# Patient Record
Sex: MALE | Race: BLACK OR AFRICAN AMERICAN | NOT HISPANIC OR LATINO | URBAN - METROPOLITAN AREA
[De-identification: names, ages, dates, MRNs, and addresses within clinical notes are randomized per-mention and may not be internally consistent; named-entity substitution may affect disease eponyms.]

---

## 2021-01-01 ENCOUNTER — NURSE TRIAGE (OUTPATIENT)
Dept: NURSING | Facility: CLINIC | Age: 26
End: 2021-01-01

## 2021-01-01 ENCOUNTER — OFFICE VISIT (OUTPATIENT)
Dept: FAMILY MEDICINE | Facility: CLINIC | Age: 26
End: 2021-01-01

## 2021-01-01 VITALS
SYSTOLIC BLOOD PRESSURE: 122 MMHG | TEMPERATURE: 98.2 F | OXYGEN SATURATION: 99 % | HEART RATE: 66 BPM | DIASTOLIC BLOOD PRESSURE: 85 MMHG | RESPIRATION RATE: 16 BRPM | WEIGHT: 129 LBS

## 2021-01-01 DIAGNOSIS — Z11.3 SCREEN FOR STD (SEXUALLY TRANSMITTED DISEASE): ICD-10-CM

## 2021-01-01 DIAGNOSIS — N34.2 URETHRITIS: Primary | ICD-10-CM

## 2021-01-01 LAB — HIV 1+2 AB+HIV1 P24 AG SERPL QL IA: NEGATIVE

## 2021-01-01 PROCEDURE — 87491 CHLMYD TRACH DNA AMP PROBE: CPT | Performed by: EMERGENCY MEDICINE

## 2021-01-01 PROCEDURE — 86780 TREPONEMA PALLIDUM: CPT | Performed by: EMERGENCY MEDICINE

## 2021-01-01 PROCEDURE — 87591 N.GONORRHOEAE DNA AMP PROB: CPT | Performed by: EMERGENCY MEDICINE

## 2021-01-01 PROCEDURE — 99203 OFFICE O/P NEW LOW 30 MIN: CPT | Mod: 25 | Performed by: EMERGENCY MEDICINE

## 2021-01-01 PROCEDURE — 87389 HIV-1 AG W/HIV-1&-2 AB AG IA: CPT | Performed by: EMERGENCY MEDICINE

## 2021-01-01 PROCEDURE — 96372 THER/PROPH/DIAG INJ SC/IM: CPT | Performed by: EMERGENCY MEDICINE

## 2021-01-01 PROCEDURE — 36415 COLL VENOUS BLD VENIPUNCTURE: CPT | Performed by: EMERGENCY MEDICINE

## 2021-01-01 RX ORDER — DOXYCYCLINE 100 MG/1
100 TABLET ORAL DAILY
Qty: 7 TABLET | Refills: 0 | Status: SHIPPED | OUTPATIENT
Start: 2021-01-01 | End: 2022-01-01

## 2021-12-31 NOTE — PROGRESS NOTES
Impression:  Urethritis, probably sexually transmitted disease    Plan:  Check urine for GC and chlamydia, HIV and RPR, ceftriaxone 500 mg IM, doxycycline 100 mg twice a day for 7 days, avoid intercourse or use condoms until antibiotics complete, advise his sexual partner to also be tested and treated      Chief Complaint:  Patient presents with:  STD: burning with urination         HPI:   Uli Ramirez is a 26 year old male who presents to this clinic for the evaluation of dysuria and penile discharge.  Patient had sexual intercourse with a new female sexual partner 3 days ago.  Yesterday he developed dysuria and penile discharge and is concerned about a sexually transmitted infection.  He has the patient and she was not aware of any STDs.  No rash.  No fever.  No other complaints      PMH:   No past medical history on file.  No past surgical history on file.      ROS:  All other systems negative    Meds:        Social:  Social History     Socioeconomic History     Marital status: Single     Spouse name: Not on file     Number of children: Not on file     Years of education: Not on file     Highest education level: Not on file   Occupational History     Not on file   Tobacco Use     Smoking status: Not on file     Smokeless tobacco: Not on file   Substance and Sexual Activity     Alcohol use: Not on file     Drug use: Not on file     Sexual activity: Not on file   Other Topics Concern     Not on file   Social History Narrative     Not on file     Social Determinants of Health     Financial Resource Strain: Not on file   Food Insecurity: Not on file   Transportation Needs: Not on file   Physical Activity: Not on file   Stress: Not on file   Social Connections: Not on file   Intimate Partner Violence: Not on file   Housing Stability: Not on file         Physical Exam:  Vitals:    12/31/21 1616   BP: 122/85   BP Location: Left arm   Patient Position: Sitting   Cuff Size: Adult Regular   Pulse: 66   Resp: 16   Temp:  98.2  F (36.8  C)   TempSrc: Oral   SpO2: 99%   Weight: 58.5 kg (129 lb)      Patient is awake, alert, no distress  Neuro: Normal motor and sensory function in all extremities  Psych: Awake, alert, normally responsive      Results:    No results found for this or any previous visit (from the past 24 hour(s)).           Roverto Blakely MD

## 2021-12-31 NOTE — TELEPHONE ENCOUNTER
Symptoms of STD   Burning with urination he noticed this yesterday. He noticed some discharge.  Per RN protocol patient should be seen today  [place]/[facility]gent Care hours and location reviewed. Home care suggestions reviewed with caller per RN protocol.   COVID 19 Nurse Triage Plan/Patient Instructions    Please be aware that novel coronavirus (COVID-19) may be circulating in the community. If you develop symptoms such as fever, cough, or SOB or if you have concerns about the presence of another infection including coronavirus (COVID-19), please contact your health care provider or visit [email or web address].     Disposition/Instructions    In-Person Visit with provider recommended. Reference Visit Selection Guide.    Thank you for taking steps to prevent the spread of this virus.  o Limit your contact with others.  o Wear a simple mask to cover your cough.  o Wash your hands well and often.    Resources    [facility]: About COVID-19: [email or web address]    CDC: What to Do If You're Sick: www.cdc.gov/coronavirus/2019-ncov/about/steps-when-sick.html    CDC: Ending Home Isolation: www.cdc.gov/coronavirus/2019-ncov/hcp/disposition-in-home-patients.html     CDC: Caring for Someone: www.cdc.gov/coronavirus/2019-ncov/if-you-are-sick/care-for-someone.html     [facility]: Interim Guidance for Hospital Discharge to Home: www.health.[facility].[place]/diseases/coronavirus/hcp/hospdischarge.pdf    [facility] clinical trials (COVID-19 research studies): [email or web address]     Below are the COVID-19 hotlines at the [facility] ([facility]). Interpreters are available.   o For health questions: Call 974-001-2360 or 1-436.770.1401 (7 a.m. to 7 p.m.)  o For questions about schools and childcare: Call 035-578-9609 or 1-678.424.2161 (7 a.m. to 7 p.m.)       Skyalr Aggarwal RN, BSN [facility] Triage Nurse            Reason for Disposition    Pain or burning with passing  urine (urination)    Pus (white, yellow) or bloody discharge from end of penis    Additional Information    Negative: Sounds like a life-threatening emergency to the triager    Negative: Followed a genital area injury    Negative: Pain or burning with passing urine (urination) is main symptom    Negative: Pain in scrotum or testicle is main symptom    Negative: Swollen scrotum OR lump in the scrotum/groin area    Negative: Pubic lice suspected    Negative: STD exposure and prevention, question about    Negative: SEVERE pain (e.g., excruciating)    Negative: Large amount of blood from end of penis    Negative: Foreskin pulled back and stuck (not circumcised)    Negative: Fever > 100.4 F (38.0 C)    Negative: Unable to urinate (or only a few drops) and bladder feels very full    Negative: Prolonged unwanted erection (i.e., not related to sexual interest) and lasting > 4 hours    Negative: Patient sounds very sick or weak to the triager    Negative: Entire penis is swollen (i.e., edema)    Negative: Looks infected (e.g., draining sore, spreading redness)    Negative: Blood in urine (red, pink, or tea-colored)    Protocols used: PENIS AND SCROTUM SYMPTOMS-A-OH

## 2022-01-01 ENCOUNTER — TELEPHONE (OUTPATIENT)
Dept: FAMILY MEDICINE | Facility: OTHER | Age: 27
End: 2022-01-01

## 2022-01-03 NOTE — TELEPHONE ENCOUNTER
Call and left message for patient to return call regarding results. Callback number provided.    Anjelica Muñoz on 1/3/2022 at 3:24 PM

## 2022-01-03 NOTE — LETTER
January 5, 2022      Uli Ramirez  [address] [place] [address]        Dear ,    We are writing to inform you of your test results.    You did test positive for gonorrhea. You have already received treatment through injection during your visit. You may discontinue the oral doxycycline as the chlamydia test came back negative. All other STD screening was negative. You should still abstain from sex for 1 week to ensure proper relief of symptoms.    Resulted Orders   Chlamydia trachomatis/Neisseria gonorrhoeae by PCR - Clinic Collect   Result Value Ref Range    Chlamydia Trachomatis Negative Negative      Comment:      Negative for C. trachomatis rRNA by transcription mediated amplification.   A negative result by transcription mediated amplification does not preclude the presence of infection because results are dependent on proper and adequate collection, absence of inhibitors and sufficient rRNA to be detected.    Neisseria gonorrhoeae Positive (A) Negative      Comment:      Positive for N. gonorrhoeae genomic DNA by [employer] real-time, reverse transcriptase PCR. A positive result from a patient after therapeutic treatment cannot be interpreted as indicating the presence of viable N gonorrhoeae.   HIV Antigen Antibody Combo   Result Value Ref Range    HIV Antigen Antibody Combo Negative Negative   Treponema Abs w Reflex to RPR and Titer   Result Value Ref Range    Treponema Antibody Total Nonreactive Nonreactive       If you have any questions or concerns, please call the clinic at [phone].       Sincerely,      [facility]

## 2022-01-03 NOTE — TELEPHONE ENCOUNTER
----- Message from Denise Villatoro PA-C sent at 1/1/2022  5:35 PM CST -----  Please inform patient, he did test positive for gonorrhea.  He has already received treatment through his injection.  He may discontinue the oral doxycycline as the chlamydia test was negative.  All other STD screening was negative.  Patient should still abstain from sex for 1 week to ensure proper relief of symptoms.

## 2022-01-05 NOTE — TELEPHONE ENCOUNTER
Call and left message for patient to return call regarding results. Callback number provided.    Anjelica Muñoz on 1/4/2022 at 6:07 PM